# Patient Record
Sex: MALE | Race: WHITE | Employment: STUDENT | URBAN - METROPOLITAN AREA
[De-identification: names, ages, dates, MRNs, and addresses within clinical notes are randomized per-mention and may not be internally consistent; named-entity substitution may affect disease eponyms.]

---
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
## 2025-02-17 NOTE — TELEPHONE ENCOUNTER
Samara Real requests that Homer be called in to the pharmacy.    Please advise.    Samara [phone]  [facility] [phone]

## 2025-02-18 ENCOUNTER — TELEPHONE (OUTPATIENT)
Age: 20
End: 2025-02-18

## 2025-02-18 NOTE — TELEPHONE ENCOUNTER
Mom was calling concerned because it has been two months since his last infusion of Stellara and was hoping to get the injections sent over to the [facility] fax number [phone] as soon as possible.  Mom would like to marlon this as urgent if possible to get done tomorrow morning.  Mom can be reached [phone]

## 2025-02-19 RX ORDER — USTEKINUMAB 90 MG/ML
90 INJECTION, SOLUTION SUBCUTANEOUS
Qty: 1 ML | Refills: 3 | Status: ACTIVE | OUTPATIENT
Start: 2025-02-19

## 2025-03-04 ENCOUNTER — TELEPHONE (OUTPATIENT)
Age: 20
End: 2025-03-04

## 2025-03-04 NOTE — TELEPHONE ENCOUNTER
Mother called if Stelara can be stopped per patient request. Stelara delivery was late this time by 2 weeks.   Rash is better but having joint pains.  Discussed briefly about recurrence if stopping medications.   Advised a sooner FU to discuss with patient as well.

## 2025-03-05 ENCOUNTER — TELEPHONE (OUTPATIENT)
Age: 20
End: 2025-03-05

## (undated) DEVICE — FORCEP BX JUMBO 4 2.8 MMX240 CM 3.2 MM OVL CUP RADIAL JAW

## (undated) DEVICE — TUBING IRRIG COMPATIBLE W ERBE MEDIVATOR PMP HYDR

## (undated) DEVICE — SINGLE-USE BIOPSY FORCEPS: Brand: RADIAL JAW 4

## (undated) DEVICE — STRAP,POSITIONING,KNEE/BODY,FOAM,4X60": Brand: MEDLINE

## (undated) DEVICE — COLON KIT WITH 1.1 OZ ORCA HYDRA SEAL 2 GOWN

## (undated) DEVICE — CONMED SCOPE SAVER BITE BLOCK, 14 X 20 MM: Brand: CONMED SCOPE SAVER